# Patient Record
Sex: MALE | Race: WHITE | NOT HISPANIC OR LATINO | URBAN - METROPOLITAN AREA
[De-identification: names, ages, dates, MRNs, and addresses within clinical notes are randomized per-mention and may not be internally consistent; named-entity substitution may affect disease eponyms.]

---

## 2023-11-13 ENCOUNTER — APPOINTMENT (OUTPATIENT)
Dept: PHYSICAL THERAPY | Facility: CLINIC | Age: 58
End: 2023-11-13
Payer: COMMERCIAL

## 2023-11-16 ENCOUNTER — EVALUATION (OUTPATIENT)
Dept: PHYSICAL THERAPY | Facility: CLINIC | Age: 58
End: 2023-11-16
Payer: COMMERCIAL

## 2023-11-16 DIAGNOSIS — S76.912A STRAIN OF UNSPECIFIED MUSCLES, FASCIA AND TENDONS AT THIGH LEVEL, LEFT THIGH, INITIAL ENCOUNTER: ICD-10-CM

## 2023-11-16 DIAGNOSIS — S76.219A STRAIN OF ADDUCTOR MUSCLE OF THIGH: Primary | ICD-10-CM

## 2023-11-16 PROCEDURE — 97162 PT EVAL MOD COMPLEX 30 MIN: CPT | Mod: GP | Performed by: PHYSICAL THERAPIST

## 2023-11-16 PROCEDURE — 97530 THERAPEUTIC ACTIVITIES: CPT | Mod: GP | Performed by: PHYSICAL THERAPIST

## 2023-11-16 PROCEDURE — 97110 THERAPEUTIC EXERCISES: CPT | Mod: GP | Performed by: PHYSICAL THERAPIST

## 2023-11-16 ASSESSMENT — ENCOUNTER SYMPTOMS
DEPRESSION: 0
LOSS OF SENSATION IN FEET: 0
OCCASIONAL FEELINGS OF UNSTEADINESS: 0

## 2023-11-16 NOTE — PROGRESS NOTES
"Physical Therapy Evaluation and Treatment      Patient Name: Irwin Nguyen  MRN: [id number]  Today's Date: 11/16/2023  Time Calculation  Start Time: 0730  Stop Time: 0808  Time Calculation (min): 38 min  PT Therapeutic Procedures Time Entry  Therapeutic Exercise Time Entry: 15  Therapeutic Activity Time Entry: 10    Current Problem:   1. Strain of adductor muscle of thigh        2. Strain of unspecified muscles, fascia and tendons at thigh level, left thigh, initial encounter  Referral to Physical Therapy    Follow Up In Physical Therapy          Subjective    General:  Pt reports a few months ago he woke up with pain in his left inner thigh. Is having pain along\"the tendon\" on the left side - thinks it is a groin pull. Normal activities don't bring on pain. Certain motions are painful. Especially butterfly stretch and twisting left leg up towards him. Pain also with rotating out. Pain hasn't been going away. No injections, no specific injury.       Precautions: None  Pain: 0 to 8/10       Objective   ROM   Hip: Left: WFL     MMT   Left LE strength:  Hip flex 4/5  Hip IR 4/5  Hip ER 4/5   Knee flex 4+/5  Knee ext 4+/5    Dermatomes/Myotomes/Reflexes  Denies numbness/tingling     Palpation   TTP left hip adductors     No edema    Flexibility  Moderate tightness to left hip adductors  Mild tightness to left hip flexors       Special Tests   Hip: AMAURY: Left positive, Jerome: Left positive     Gait   Ambulates with mild decrease in dyan. Mild left LE in ER with minimal decreased stance time    Stairs   Ascend and descend stair with reciprocal pattern       Outcome Measures:  LEFS: 52/80    Treatments:  Therapeutic Exercise:  Seated hip adductor stretch  SLR  S/L hip ADD  Hip ADD    Therapeutic Activity:  Educated pt on eval findings and POC.  Educated pt on anatomy of hip and adductors  Reviewed gym activities to perform      Assessment   Pt is a 58 y.o. male with left hip adductor/groin strain. Pt with decreased strength, " mild gait deficits, and flexibility restrictions. Pt will benefit from skilled PT to address the above deficits for return to PLOF.       PT Assessment Results: Decreased strength, Decreased range of motion, Decreased endurance, Decreased mobility, Pain  Rehab Prognosis: Good  Evaluation/Treatment Tolerance: Patient tolerated treatment well  Moderate complexity due to patient's clinical presentation being evolving with changing characteristics, with comorbidities to include arthritis, all of which may negatively impact rehab tolerance and progression.     Plan:   Treatment/Interventions: Dry needling, Education/ Instruction, Gait training, Manual therapy, Neuromuscular re-education, Taping techniques, Therapeutic activities, Therapeutic exercises  PT Plan: Skilled PT  PT Frequency: 1 time per week  Duration: 3 more visits  Rehab Potential: Good  Plan of Care Agreement: Patient        Goals:   Active       Mobility       Goal 1       Start:  11/16/23    Expected End:  02/14/24       Pt will improve left LE strength to 5/5 to improve I/ADLs            Pain       Goal 1       Start:  11/16/23    Expected End:  02/14/24       Pt will perform all work out/gym activities with 0/10 pain.         Goal 2       Start:  11/16/23    Expected End:  02/14/24       Pt will stand/walk/run >30 min with 0/10 pain.

## 2023-11-28 ENCOUNTER — TREATMENT (OUTPATIENT)
Dept: PHYSICAL THERAPY | Facility: CLINIC | Age: 58
End: 2023-11-28
Payer: COMMERCIAL

## 2023-11-28 DIAGNOSIS — S76.012A STRAIN OF LEFT HIP ADDUCTOR MUSCLE: Primary | ICD-10-CM

## 2023-11-28 DIAGNOSIS — S76.912A STRAIN OF UNSPECIFIED MUSCLES, FASCIA AND TENDONS AT THIGH LEVEL, LEFT THIGH, INITIAL ENCOUNTER: ICD-10-CM

## 2023-11-28 DIAGNOSIS — S76.012A STRAIN OF LEFT HIP ADDUCTOR MUSCLE, INITIAL ENCOUNTER: ICD-10-CM

## 2023-11-28 PROCEDURE — 97110 THERAPEUTIC EXERCISES: CPT | Mod: GP | Performed by: PHYSICAL THERAPIST

## 2023-11-28 NOTE — PROGRESS NOTES
Physical Therapy Treatment    Patient Name: Irwin Nguyen  MRN: [id number]  Today's Date: 11/28/2023  Time Calculation  Start Time: 0725  Stop Time: 0805  Time Calculation (min): 40 min  PT Therapeutic Procedures Time Entry  Therapeutic Exercise Time Entry: 40  Visit # 2/4    Current Problem   1. Strain of left hip adductor muscle        2. Strain of unspecified muscles, fascia and tendons at thigh level, left thigh, initial encounter  Follow Up In Physical Therapy      3. Strain of left hip adductor muscle, initial encounter            Subjective   General   Pt still getting twinge in left groin muscle. Did exercises about 6 out of 10 days.     Precautions: None  Pain 0/10  Post Treatment Pain Level 0/10    Objective   Decreased activation of left hip adductors with compensation from abductors to initiate movement     Treatments:  SciFit LE only, x6 minutes  Stand hip ADD stretch at stairs, 30 seconds x 3  Stand hip ABD, 2x10 ea R/L   FWD step ups, 6inch, 2x10   Leg swings lateral, 2x10 ea R/L  Seated hip adductor stretch, 30 seconds x 3  SLR, 2x10  Bridge, 2x10   S/L hip ADD, 2x10  S/L hip ABD, 2x10  Hip ADD, 2x10      Assessment   Pt tolerated there ex progressions without experiencing pain. Continues to demonstrate moderate left adductor tightness with stiffness and lack of mobility.     Plan: Cont with stretching and strength     Education: Added hip ADD stretch at step, bridge, hip ABD, leg swings    Goals:   Active       Mobility       Goal 1       Start:  11/16/23    Expected End:  02/14/24       Pt will improve left LE strength to 5/5 to improve I/ADLs            Pain       Goal 1       Start:  11/16/23    Expected End:  02/14/24       Pt will perform all work out/gym activities with 0/10 pain.         Goal 2       Start:  11/16/23    Expected End:  02/14/24       Pt will stand/walk/run >30 min with 0/10 pain.

## 2023-12-05 ENCOUNTER — TREATMENT (OUTPATIENT)
Dept: PHYSICAL THERAPY | Facility: CLINIC | Age: 58
End: 2023-12-05
Payer: COMMERCIAL

## 2023-12-05 DIAGNOSIS — S76.912A STRAIN OF UNSPECIFIED MUSCLES, FASCIA AND TENDONS AT THIGH LEVEL, LEFT THIGH, INITIAL ENCOUNTER: ICD-10-CM

## 2023-12-05 PROCEDURE — 97110 THERAPEUTIC EXERCISES: CPT | Mod: GP | Performed by: PHYSICAL THERAPIST

## 2023-12-05 NOTE — PROGRESS NOTES
Physical Therapy Treatment    Patient Name: Irwin Nguyen  MRN: [id number]  Today's Date: 12/5/2023  Time Calculation  Start Time: 0725  Stop Time: 0805  Time Calculation (min): 40 min  PT Therapeutic Procedures Time Entry  Therapeutic Exercise Time Entry: 40  Visit # 3/4    Current Problem   1. Strain of unspecified muscles, fascia and tendons at thigh level, left thigh, initial encounter  Follow Up In Physical Therapy          Subjective   General   Pt still getting soreness in left groin, but feels he is getting better.    Precautions: None  Pain 0/10  Post Treatment Pain Level 0/10    Objective   Ascends and descends stairs with reciprocal pattern, normal dyan.     Treatments:  SciFit LE only, x6 minutes  Stand hip ADD stretch at stairs, 30 seconds x 3  Stand hamstring stretch, 30 seconds x 3   Stand hip ABD, 2x10 ea R/L   LAT step ups, 6inch, 2x10   Mini LAT lunge, 2x10 ea R/L   Seated hip adductor stretch, 30 seconds x 3  SLR, 2x10  Hip ADD, 2x10  Bridge with ADD,  2x10   S/L hip ADD, 2x10      NOT TODAY:  Leg swings lateral, 2x10 ea R/L   S/L hip ABD, 2x10      Assessment   Able to progress to lunges to further strengthen adductors in all planes. Mild spasm with sidelying hip adduction, able to eliminate after stretching.     Plan: Stretch and strength     OP EDUCATION: Stretch to lunge for HEP     Goals:   Active       Mobility       Goal 1       Start:  11/16/23    Expected End:  02/14/24       Pt will improve left LE strength to 5/5 to improve I/ADLs            Pain       Goal 1       Start:  11/16/23    Expected End:  02/14/24       Pt will perform all work out/gym activities with 0/10 pain.         Goal 2       Start:  11/16/23    Expected End:  02/14/24       Pt will stand/walk/run >30 min with 0/10 pain.

## 2023-12-12 ENCOUNTER — TREATMENT (OUTPATIENT)
Dept: PHYSICAL THERAPY | Facility: CLINIC | Age: 58
End: 2023-12-12
Payer: COMMERCIAL

## 2023-12-12 DIAGNOSIS — S76.912A STRAIN OF UNSPECIFIED MUSCLES, FASCIA AND TENDONS AT THIGH LEVEL, LEFT THIGH, INITIAL ENCOUNTER: ICD-10-CM

## 2023-12-12 PROCEDURE — 97110 THERAPEUTIC EXERCISES: CPT | Mod: GP | Performed by: PHYSICAL THERAPIST

## 2023-12-12 NOTE — PROGRESS NOTES
Physical Therapy Treatment    Patient Name: Irwin Nguyen  MRN: [id number]  Today's Date: 12/12/2023  Time Calculation  Start Time: 0725  Stop Time: 0808  Time Calculation (min): 43 min  PT Therapeutic Procedures Time Entry  Therapeutic Exercise Time Entry: 42  Visit # 4/4    Current Problem   1. Strain of unspecified muscles, fascia and tendons at thigh level, left thigh, initial encounter  Follow Up In Physical Therapy          Subjective   General   Pt reports good improvements each day in left groin. No longer getting twinge of pain, only noticing some tightness.      Precautions: None   Pain 0/10  Post Treatment Pain Level 0/10    Objective   Left hip AROM: WNL and full    Left LE strength:  Hip flex 5/5  Knee flex 5/5  Knee ext 5/5    Flexibility: WNL  Gait: Ambulates without deficits     Treatments:  SciFit LE only, x6 minutes  Stand hip ADD stretch at stairs, 30 seconds x 3  Stand hamstring stretch, 30 seconds x 3   Stand hip ABD, 2x10 ea R/L   LAT step ups, 6inch, 2x10 ea R/L  Side steps red ankles, 20 feet x 6  Zig zags red ankles, 20 feet x 6  Mini LAT lunge, 2x10 ea R/L   Towel slides LAT, 2x10 ea R/L  Towel slides EXT 2x10 ea R/L   Open hip standing, 2x10 L   Seated hip adductor stretch, 30 seconds x 3        Assessment   Pt with good progress during therapy and has met all his goals. He is able to perform all activities without pain and has normal AROM and strength without gait deficits. Pt to be discharged at this time and continue with HEP on his own. He is in good understanding.     Plan: Discharge    Goals:   Active       Mobility       Goal 1       Start:  11/16/23    Expected End:  02/14/24       Pt will improve left LE strength to 5/5 to improve I/ADLs            Pain       Goal 1       Start:  11/16/23    Expected End:  02/14/24       Pt will perform all work out/gym activities with 0/10 pain.         Goal 2       Start:  11/16/23    Expected End:  02/14/24       Pt will stand/walk/run >30 min with  0/10 pain.

## 2024-09-26 ENCOUNTER — DOCUMENTATION (OUTPATIENT)
Dept: PRIMARY CARE | Facility: CLINIC | Age: 59
End: 2024-09-26
Payer: COMMERCIAL

## 2024-09-26 ENCOUNTER — OFFICE VISIT (OUTPATIENT)
Dept: PRIMARY CARE | Facility: CLINIC | Age: 59
End: 2024-09-26
Payer: COMMERCIAL

## 2024-09-26 VITALS
DIASTOLIC BLOOD PRESSURE: 72 MMHG | HEART RATE: 66 BPM | TEMPERATURE: 97 F | OXYGEN SATURATION: 97 % | HEIGHT: 66 IN | BODY MASS INDEX: 22.18 KG/M2 | WEIGHT: 138 LBS | SYSTOLIC BLOOD PRESSURE: 116 MMHG

## 2024-09-26 DIAGNOSIS — E78.5 DYSLIPIDEMIA: ICD-10-CM

## 2024-09-26 DIAGNOSIS — Z00.00 ANNUAL PHYSICAL EXAM: Primary | ICD-10-CM

## 2024-09-26 DIAGNOSIS — K40.90 RIGHT INGUINAL HERNIA: Chronic | ICD-10-CM

## 2024-09-26 DIAGNOSIS — Z12.5 SCREENING FOR PROSTATE CANCER: ICD-10-CM

## 2024-09-26 PROBLEM — S76.012A STRAIN OF LEFT HIP ADDUCTOR MUSCLE: Status: RESOLVED | Noted: 2023-11-28 | Resolved: 2024-09-26

## 2024-09-26 LAB — HEMOGLOBIN A1C/HEMOGLOBIN TOTAL IN BLOOD EXTERNAL: 5.1 %

## 2024-09-26 PROCEDURE — 3008F BODY MASS INDEX DOCD: CPT | Performed by: INTERNAL MEDICINE

## 2024-09-26 PROCEDURE — 1036F TOBACCO NON-USER: CPT | Performed by: INTERNAL MEDICINE

## 2024-09-26 PROCEDURE — 99396 PREV VISIT EST AGE 40-64: CPT | Performed by: INTERNAL MEDICINE

## 2024-09-26 ASSESSMENT — PATIENT HEALTH QUESTIONNAIRE - PHQ9
1. LITTLE INTEREST OR PLEASURE IN DOING THINGS: NOT AT ALL
SUM OF ALL RESPONSES TO PHQ9 QUESTIONS 1 AND 2: 0
SUM OF ALL RESPONSES TO PHQ9 QUESTIONS 1 AND 2: 0
1. LITTLE INTEREST OR PLEASURE IN DOING THINGS: NOT AT ALL
2. FEELING DOWN, DEPRESSED OR HOPELESS: NOT AT ALL
2. FEELING DOWN, DEPRESSED OR HOPELESS: NOT AT ALL

## 2024-09-26 ASSESSMENT — ENCOUNTER SYMPTOMS
UNEXPECTED WEIGHT CHANGE: 0
TREMORS: 0
DEPRESSION: 0
OCCASIONAL FEELINGS OF UNSTEADINESS: 0
SHORTNESS OF BREATH: 0
SINUS PAIN: 0
PALPITATIONS: 0
HEMATURIA: 0
NUMBNESS: 0
ABDOMINAL PAIN: 0
BLOOD IN STOOL: 0
WHEEZING: 0
JOINT SWELLING: 0
NERVOUS/ANXIOUS: 0
FATIGUE: 0
POLYPHAGIA: 0
LOSS OF SENSATION IN FEET: 0
CONSTIPATION: 0
COUGH: 0
POLYDIPSIA: 0
WEAKNESS: 0
ARTHRALGIAS: 0
SORE THROAT: 0

## 2024-09-26 ASSESSMENT — COLUMBIA-SUICIDE SEVERITY RATING SCALE - C-SSRS
2. HAVE YOU ACTUALLY HAD ANY THOUGHTS OF KILLING YOURSELF?: NO
6. HAVE YOU EVER DONE ANYTHING, STARTED TO DO ANYTHING, OR PREPARED TO DO ANYTHING TO END YOUR LIFE?: NO
1. IN THE PAST MONTH, HAVE YOU WISHED YOU WERE DEAD OR WISHED YOU COULD GO TO SLEEP AND NOT WAKE UP?: NO

## 2024-09-26 ASSESSMENT — PAIN SCALES - GENERAL: PAINLEVEL: 0-NO PAIN

## 2024-09-26 NOTE — PROGRESS NOTES
"Subjective   Patient ID: Irwin Nguyen is a 59 y.o. male who presents for Annual Exam.    HPI     He gets his yearly labs at work,   Reviewed labs with patient          History of hemorrhoids- had surgery more than 5 years ago. Had recurrence within few years. Still has an intermittent rectal bleeding.         Chronic right inguinal hernia- denied any obstructive symptoms. No change in size per patient.         Review of Systems   Constitutional:  Negative for fatigue and unexpected weight change.   HENT:  Negative for congestion, ear pain, sinus pain and sore throat.    Respiratory:  Negative for cough, shortness of breath and wheezing.    Cardiovascular:  Negative for chest pain, palpitations and leg swelling.   Gastrointestinal:  Negative for abdominal pain, blood in stool and constipation.        Right inguinal hernia+   Endocrine: Negative for polydipsia, polyphagia and polyuria.   Genitourinary:  Negative for hematuria and urgency.   Musculoskeletal:  Negative for arthralgias and joint swelling.   Skin:  Negative for rash.   Neurological:  Negative for tremors, syncope, weakness and numbness.   Psychiatric/Behavioral:  Negative for behavioral problems. The patient is not nervous/anxious.        Objective   /72 (BP Location: Left arm, Patient Position: Sitting, BP Cuff Size: Small adult)   Pulse 66   Temp 36.1 °C (97 °F) (Temporal)   Ht 1.676 m (5' 6\")   Wt 62.6 kg (138 lb)   SpO2 97%   BMI 22.27 kg/m²     Physical Exam  Constitutional:       General: He is not in acute distress.  HENT:      Head: Normocephalic and atraumatic.      Right Ear: Tympanic membrane normal.      Left Ear: Tympanic membrane normal.   Eyes:      Extraocular Movements: Extraocular movements intact.      Conjunctiva/sclera: Conjunctivae normal.      Pupils: Pupils are equal, round, and reactive to light.   Neck:      Vascular: No carotid bruit.   Cardiovascular:      Rate and Rhythm: Normal rate and regular rhythm.      Pulses: " Normal pulses.      Heart sounds: No murmur heard.  Pulmonary:      Effort: Pulmonary effort is normal.      Breath sounds: Normal breath sounds. No wheezing or rales.   Abdominal:      General: Bowel sounds are normal.      Palpations: Abdomen is soft. There is no mass.      Tenderness: There is no abdominal tenderness. There is no guarding.      Comments: Right inguinal hernia+   Musculoskeletal:         General: Normal range of motion.      Cervical back: Normal range of motion and neck supple.      Right lower leg: No edema.      Left lower leg: No edema.   Lymphadenopathy:      Cervical: No cervical adenopathy.   Skin:     General: Skin is warm and dry.      Findings: No lesion.   Neurological:      General: No focal deficit present.      Mental Status: He is alert and oriented to person, place, and time.      Cranial Nerves: No cranial nerve deficit.      Gait: Gait normal.   Psychiatric:         Mood and Affect: Mood normal.         Assessment/Plan        Irwin was seen today for annual exam.  Diagnoses and all orders for this visit:  Annual physical exam (Primary)  Right inguinal hernia  Dyslipidemia    LDL level 111, cholesterol HDL ratio less than 3.5  Advised low-carb diet  He is considering right inguinal hernia surgery next year, he will call when he is ready for surgery  Denied any obstructive symptoms